# Patient Record
Sex: MALE | Race: WHITE | Employment: UNEMPLOYED | URBAN - METROPOLITAN AREA
[De-identification: names, ages, dates, MRNs, and addresses within clinical notes are randomized per-mention and may not be internally consistent; named-entity substitution may affect disease eponyms.]

---

## 2022-02-02 ENCOUNTER — OFFICE VISIT (OUTPATIENT)
Dept: URGENT CARE | Age: 22
End: 2022-02-02
Payer: COMMERCIAL

## 2022-02-02 VITALS
WEIGHT: 153.9 LBS | SYSTOLIC BLOOD PRESSURE: 150 MMHG | TEMPERATURE: 99.9 F | HEIGHT: 71 IN | DIASTOLIC BLOOD PRESSURE: 83 MMHG | RESPIRATION RATE: 16 BRPM | OXYGEN SATURATION: 100 % | HEART RATE: 101 BPM | BODY MASS INDEX: 21.55 KG/M2

## 2022-02-02 DIAGNOSIS — K13.0 ABSCESS OF LIP: Primary | ICD-10-CM

## 2022-02-02 PROCEDURE — S9083 URGENT CARE CENTER GLOBAL: HCPCS | Performed by: FAMILY MEDICINE

## 2022-02-02 RX ORDER — CITALOPRAM 20 MG/1
TABLET, FILM COATED ORAL DAILY
COMMUNITY

## 2022-02-02 RX ORDER — IBUPROFEN 800 MG/1
800 TABLET ORAL
Qty: 30 TABLET | Refills: 0 | Status: SHIPPED | OUTPATIENT
Start: 2022-02-02

## 2022-02-02 RX ORDER — ACETAMINOPHEN AND CODEINE PHOSPHATE 300; 30 MG/1; MG/1
1 TABLET ORAL
Qty: 12 TABLET | Refills: 0 | Status: SHIPPED | OUTPATIENT
Start: 2022-02-02 | End: 2022-02-05

## 2022-02-02 RX ORDER — AMITRIPTYLINE HYDROCHLORIDE 25 MG/1
TABLET, FILM COATED ORAL
COMMUNITY

## 2022-02-02 RX ORDER — KETOROLAC TROMETHAMINE 30 MG/ML
60 INJECTION, SOLUTION INTRAMUSCULAR; INTRAVENOUS ONCE
Status: SHIPPED | OUTPATIENT
Start: 2022-02-02 | End: 2022-02-03

## 2022-02-02 RX ORDER — CEFDINIR 300 MG/1
300 CAPSULE ORAL 2 TIMES DAILY
Qty: 20 CAPSULE | Refills: 0 | Status: SHIPPED | OUTPATIENT
Start: 2022-02-02

## 2022-02-02 RX ORDER — CLINDAMYCIN HYDROCHLORIDE 300 MG/1
300 CAPSULE ORAL 3 TIMES DAILY
Qty: 30 CAPSULE | Refills: 0 | Status: SHIPPED | OUTPATIENT
Start: 2022-02-02 | End: 2022-02-12

## 2022-02-02 RX ORDER — CLINDAMYCIN PHOSPHATE 150 MG/ML
600 INJECTION, SOLUTION INTRAVENOUS ONCE
Status: SHIPPED | OUTPATIENT
Start: 2022-02-02 | End: 2022-02-03

## 2022-02-03 NOTE — PROGRESS NOTES
Abscess  This is a new problem. The current episode started more than 2 days ago. The problem occurs constantly. The problem has been rapidly worsening. Associated symptoms comments: Swelling on left upper lip- started with small boil and increased rapidly   Its draining some pus  Very painful - 10/10  Not able to open mouth and talk . He has tried nothing for the symptoms. Past Medical History:   Diagnosis Date    Eczema 8/26/2016        History reviewed. No pertinent surgical history. History reviewed. No pertinent family history. Social History     Socioeconomic History    Marital status: SINGLE     Spouse name: Not on file    Number of children: Not on file    Years of education: Not on file    Highest education level: Not on file   Occupational History    Not on file   Tobacco Use    Smoking status: Never Smoker    Smokeless tobacco: Never Used   Substance and Sexual Activity    Alcohol use: Not Currently    Drug use: Yes     Types: Marijuana    Sexual activity: Not on file   Other Topics Concern    Not on file   Social History Narrative    Not on file     Social Determinants of Health     Financial Resource Strain:     Difficulty of Paying Living Expenses: Not on file   Food Insecurity:     Worried About Running Out of Food in the Last Year: Not on file    Yoly of Food in the Last Year: Not on file   Transportation Needs:     Lack of Transportation (Medical): Not on file    Lack of Transportation (Non-Medical):  Not on file   Physical Activity:     Days of Exercise per Week: Not on file    Minutes of Exercise per Session: Not on file   Stress:     Feeling of Stress : Not on file   Social Connections:     Frequency of Communication with Friends and Family: Not on file    Frequency of Social Gatherings with Friends and Family: Not on file    Attends Mu-ism Services: Not on file    Active Member of Clubs or Organizations: Not on file    Attends Club or Organization Meetings: Not on file    Marital Status: Not on file   Intimate Partner Violence:     Fear of Current or Ex-Partner: Not on file    Emotionally Abused: Not on file    Physically Abused: Not on file    Sexually Abused: Not on file   Housing Stability:     Unable to Pay for Housing in the Last Year: Not on file    Number of [place] in the Last Year: Not on file    Unstable Housing in the Last Year: Not on file                ALLERGIES: Patient has no known allergies. Review of Systems   Constitutional: Negative for fever. Skin:        Abscess of lip   All other systems reviewed and are negative. Vitals:    02/02/22 1659   BP: (!) 150/83   Pulse: (!) 101   Resp: 16   Temp: 99.9 °F (37.7 °C)   SpO2: 100%   Weight: 153 lb 14.4 oz (69.8 kg)   Height: 5' 11\" (1.803 m)       Physical Exam  Vitals and nursing note reviewed. Constitutional:       Appearance: He is not ill-appearing. HENT:      Mouth/Throat:      Mouth: No injury. Pharynx: No pharyngeal swelling. Comments: Large abscess on upper lip, tender -   Neurological:      Mental Status: He is alert. MDM    Procedures        ICD-10-CM ICD-9-CM    1. Abscess of lip  K13.0 528.5 acetaminophen-codeine (Tylenol-Codeine #3) 300-30 mg per tablet    lower lip       Refused to get injections       Medications Ordered Today   Medications    clindamycin phosphate (CLEOCIN) 150 mg/mL injection 600 mg     Order Specific Question:   Antibiotic Indications     Answer:   Skin and Soft Tissue Infection     Order Specific Question:   Skin duration of therapy     Answer: Other    ketorolac tromethamine (TORADOL) 60 mg/2 mL injection 60 mg    clindamycin (CLEOCIN) 300 mg capsule     Sig: Take 1 Capsule by mouth three (3) times daily for 10 days. Dispense:  30 Capsule     Refill:  0    cefdinir (OMNICEF) 300 mg capsule     Sig: Take 1 Capsule by mouth two (2) times a day.      Dispense:  20 Capsule     Refill:  0    acetaminophen-codeine (Tylenol-Codeine #3) 300-30 mg per tablet     Sig: Take 1 Tablet by mouth every four (4) hours as needed for Pain for up to 3 days. Max Daily Amount: 6 Tablets. Dispense:  12 Tablet     Refill:  0    ibuprofen (MOTRIN) 800 mg tablet     Sig: Take 1 Tablet by mouth every eight (8) hours as needed for Pain. Dispense:  30 Tablet     Refill:  0     No results found for any visits on 02/02/22. The patients condition was discussed with the patient and they understand. The patient is to follow up with primary care doctor. If signs and symptoms become worse the pt is to go to the ER. The patient is to take medications as prescribed.

## 2023-05-10 RX ORDER — AMITRIPTYLINE HYDROCHLORIDE 25 MG/1
TABLET, FILM COATED ORAL
COMMUNITY

## 2023-05-10 RX ORDER — CITALOPRAM 20 MG/1
TABLET ORAL DAILY
COMMUNITY

## 2023-05-10 RX ORDER — IBUPROFEN 800 MG/1
TABLET ORAL EVERY 8 HOURS PRN
COMMUNITY
Start: 2022-02-02

## 2023-05-10 RX ORDER — CEFDINIR 300 MG/1
CAPSULE ORAL 2 TIMES DAILY
COMMUNITY
Start: 2022-02-02